# Patient Record
Sex: MALE | Race: WHITE | Employment: FULL TIME | URBAN - METROPOLITAN AREA
[De-identification: names, ages, dates, MRNs, and addresses within clinical notes are randomized per-mention and may not be internally consistent; named-entity substitution may affect disease eponyms.]

---

## 2021-02-23 ENCOUNTER — APPOINTMENT (OUTPATIENT)
Dept: GENERAL RADIOLOGY | Facility: CLINIC | Age: 31
End: 2021-02-23
Attending: EMERGENCY MEDICINE

## 2021-02-23 ENCOUNTER — HOSPITAL ENCOUNTER (EMERGENCY)
Facility: CLINIC | Age: 31
Discharge: HOME | End: 2021-02-23
Attending: PHYSICIAN ASSISTANT | Admitting: PHYSICIAN ASSISTANT

## 2021-02-23 VITALS
RESPIRATION RATE: 16 BRPM | DIASTOLIC BLOOD PRESSURE: 62 MMHG | TEMPERATURE: 98.1 F | SYSTOLIC BLOOD PRESSURE: 138 MMHG | WEIGHT: 210 LBS | OXYGEN SATURATION: 98 % | HEART RATE: 76 BPM

## 2021-02-23 DIAGNOSIS — M79.672 LEFT FOOT PAIN: ICD-10-CM

## 2021-02-23 PROCEDURE — 73630 X-RAY EXAM OF FOOT: CPT | Mod: LT

## 2021-02-23 PROCEDURE — 99283 EMERGENCY DEPT VISIT LOW MDM: CPT

## 2021-02-23 ASSESSMENT — ENCOUNTER SYMPTOMS
JOINT SWELLING: 1
HEADACHES: 0
ARTHRALGIAS: 1
MYALGIAS: 0

## 2021-02-23 NOTE — ED NOTES
Pt fitted with post op show and crutches.  He is able to walk with return demonstration using the crutches and shoe.

## 2021-02-23 NOTE — ED TRIAGE NOTES
"Patient reports fall 2 days ago with left foot plantar flexed under his bottom when he landed. complains of mid-foot pain and pain at the base of his left great toe. Some swelling. He is able to bear weight, \"but it is painful\".   "

## 2021-02-23 NOTE — LETTER
February 23, 2021      To Whom It May Concern:      Guzman Hull was seen in our Emergency Department today, 02/23/21.  I expect his condition to improve over the next 3 days.  He may return to work/school when improved.    Sincerely,        Faith DEL CID RN, BSN, [place]

## 2021-02-23 NOTE — ED NOTES
A/O. . Pt verbalized understanding of d/c instructions and ambulated to lobby on crutches. MD note given.

## 2021-02-23 NOTE — DISCHARGE INSTRUCTIONS
Use Tylenol and ibuprofen for pain.  You may ambulate on foot as tolerate. Be cautious with weightbearing.  With persistent pain, follow up with orthopedics for further evaluation.

## 2021-02-23 NOTE — ED PROVIDER NOTES
History   Chief Complaint:  Foot Pain       HPI   Guzman Hull is a 30 year old male who presents with left foot pain. The patient says that on Sunday (12/21) night he was leaving work when he slipped on the snow and fell backwards. He says that he landed on his left foot and heard vitals and physical exam as above a pop. He says that since then his left foot has swollen and been painful. He endorses the use of ice and Advil to minor relief. Guzman says that when he fell he did not hit his head or have any loss of consciousness. He denies any pain other than his left foot.       Review of Systems   Cardiovascular: Negative for chest pain.   Musculoskeletal: Positive for arthralgias and joint swelling. Negative for myalgias.   Neurological: Negative for syncope and headaches.   All other systems reviewed and are negative.      Allergies:  No Known Drug Allergies     Medications:  Advil     Past Medical History:    Patient denies any past medical issues.      Social History:  Patient presents to the ED alone.     Physical Exam     Patient Vitals for the past 24 hrs:   BP Temp Temp src Pulse Resp SpO2 Weight   02/23/21 1226 138/62 98.1  F (36.7  C) Temporal 76 16 98 % 95.3 kg (210 lb)       Physical Exam  HENT: mmm  Eyes: PERRL B/L  Neck: Supple  CV: Peripheral pulses in tact and regular  Resp: Speaking in full sentences without any respiratory distress  Ext:  Left Foot  No TTP over the inferior 6 cm of the posterior medial malleolus  No TTP over the inferior 6 cm of the posterior lateral malleolus  TTP over the navicular.  TTP of 1st phalanges.  No TTP base of 5th MT  No TTP fibular head  Soft tissue swelling present over dorsum of foot.  This is a closed injury  Able to fire foot/toe flexors and extensors  Sensation and perfusion intact throughout foot     Remainder of the skeletal survey is unremarkable     Skin: warm dry well perfused  Neuro: Alert, no gross motor or sensory deficits, gait stable  Psych:  Calm  Nursing notes and vital signs reviewed.    Emergency Department Course       Imaging:  XR Foot Left G/E 3 Views  Calcification along the dorsal margin of the talar head.  This is of uncertain significance and could indicate talonavicular  capsular avulsion injury versus vascular calcification. Otherwise  unremarkable foot radiographs.  INGRID ECKERT MD  Reading per radiology     Emergency Department Course:    Reviewed:  1237 I reviewed the patient's nursing notes, vitals, past medical records, Care Everywhere.      Assessments:  1256 I performed an exam of the patient as documented above.   1320 Patient rechecked and updated.      Disposition:  The patient was discharged to home.       Impression & Plan     Medical Decision Making:  Guzman Hull is a 30 year old male who presents to the emergency department today for evaluation of foot pain. Patient slipped two days ago and fell on top of his left foot. He has had pain with ambulating since that time. See HPI as above for additional details.  Vitals and physical exam as above.  X-rays obtained without evidence for acute bony abnormality.  There is evidence for calcification along the dorsal margin of the patient's talar head.  There is no tenderness to palpation of this area, suggesting against acute avulsion injury.  Suspect sprain at this time.  Patient provided crutches and postop shoe.  [place] patient was safe for discharge home. Tylenol and ibuprofen for home. Discussed reasons to return. All questions answered. Patient discharged to home in stable condition.    Diagnosis:    ICD-10-CM    1. Left foot pain  M79.672        Discharge Medications:  New Prescriptions    No medications on file       Scribe Disclosure:  I, Kennedy Randolph, am serving as a scribe at 12:38 PM on 2/23/2021 to document services personally performed by Shawn Holm PA-C based on my observations and the provider's statements to me.     This record was created at least in part  using electronic voice recognition software, so please excuse any typographical errors.         Shawn Holm PA-C  02/23/21 [id number]

## 2021-05-01 ENCOUNTER — HEALTH MAINTENANCE LETTER (OUTPATIENT)
Age: 31
End: 2021-05-01

## 2021-10-11 ENCOUNTER — HEALTH MAINTENANCE LETTER (OUTPATIENT)
Age: 31
End: 2021-10-11

## 2022-05-22 ENCOUNTER — HEALTH MAINTENANCE LETTER (OUTPATIENT)
Age: 32
End: 2022-05-22

## 2022-09-25 ENCOUNTER — HEALTH MAINTENANCE LETTER (OUTPATIENT)
Age: 32
End: 2022-09-25

## 2023-06-04 ENCOUNTER — HEALTH MAINTENANCE LETTER (OUTPATIENT)
Age: 33
End: 2023-06-04